# Patient Record
Sex: FEMALE | Race: WHITE | HISPANIC OR LATINO | URBAN - METROPOLITAN AREA
[De-identification: names, ages, dates, MRNs, and addresses within clinical notes are randomized per-mention and may not be internally consistent; named-entity substitution may affect disease eponyms.]

---

## 2023-09-09 ENCOUNTER — EMERGENCY (EMERGENCY)
Facility: HOSPITAL | Age: 23
LOS: 1 days | Discharge: HOME | End: 2023-09-09
Attending: EMERGENCY MEDICINE | Admitting: EMERGENCY MEDICINE
Payer: COMMERCIAL

## 2023-09-09 VITALS
OXYGEN SATURATION: 100 % | DIASTOLIC BLOOD PRESSURE: 69 MMHG | RESPIRATION RATE: 18 BRPM | HEIGHT: 62 IN | HEART RATE: 86 BPM | WEIGHT: 130.07 LBS | SYSTOLIC BLOOD PRESSURE: 123 MMHG | TEMPERATURE: 98 F

## 2023-09-09 VITALS
RESPIRATION RATE: 16 BRPM | OXYGEN SATURATION: 99 % | DIASTOLIC BLOOD PRESSURE: 76 MMHG | SYSTOLIC BLOOD PRESSURE: 110 MMHG | TEMPERATURE: 98 F | HEART RATE: 67 BPM

## 2023-09-09 PROCEDURE — 73070 X-RAY EXAM OF ELBOW: CPT

## 2023-09-09 PROCEDURE — 73070 X-RAY EXAM OF ELBOW: CPT | Mod: 26,LT

## 2023-09-09 PROCEDURE — 99283 EMERGENCY DEPT VISIT LOW MDM: CPT | Mod: 25

## 2023-09-09 PROCEDURE — 99284 EMERGENCY DEPT VISIT MOD MDM: CPT

## 2023-09-09 RX ORDER — LIDOCAINE 4 G/100G
1 CREAM TOPICAL ONCE
Refills: 0 | Status: COMPLETED | OUTPATIENT
Start: 2023-09-09 | End: 2023-09-09

## 2023-09-09 RX ORDER — IBUPROFEN 200 MG
600 TABLET ORAL ONCE
Refills: 0 | Status: COMPLETED | OUTPATIENT
Start: 2023-09-09 | End: 2023-09-09

## 2023-09-09 RX ORDER — IBUPROFEN 200 MG
1 TABLET ORAL
Qty: 21 | Refills: 0
Start: 2023-09-09 | End: 2023-09-15

## 2023-09-09 RX ORDER — CYCLOBENZAPRINE HYDROCHLORIDE 10 MG/1
1 TABLET, FILM COATED ORAL
Qty: 21 | Refills: 0
Start: 2023-09-09 | End: 2023-09-15

## 2023-09-09 RX ORDER — DIAZEPAM 5 MG
2 TABLET ORAL ONCE
Refills: 0 | Status: DISCONTINUED | OUTPATIENT
Start: 2023-09-09 | End: 2023-09-09

## 2023-09-09 RX ADMIN — LIDOCAINE 1 PATCH: 4 CREAM TOPICAL at 20:55

## 2023-09-09 RX ADMIN — Medication 2 MILLIGRAM(S): at 20:56

## 2023-09-09 RX ADMIN — Medication 600 MILLIGRAM(S): at 20:55

## 2023-09-09 RX ADMIN — Medication 600 MILLIGRAM(S): at 21:20
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
## 2023-09-09 NOTE — ED PROVIDER NOTE - CARE PROVIDER_API CALL
Yordy Dorantes  Orthopaedic Surgery  [address] [place] [address]  Phone: [phone]  Fax: [phone]  Follow Up Time:

## 2023-09-09 NOTE — ED ADULT NURSE NOTE - NSFALLUNIVINTERV_ED_ALL_ED
Bed/Stretcher in lowest position, wheels locked, appropriate side rails in place/Call bell, personal items and telephone in reach/Instruct patient to call for assistance before getting out of bed/chair/stretcher/Non-slip footwear applied when patient is off stretcher/[place] to call system/Physically safe environment - no spills, clutter or unnecessary equipment/Purposeful proactive rounding/Room/bathroom lighting operational, light cord in reach
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
## 2023-09-09 NOTE — ED PROVIDER NOTE - PATIENT PORTAL LINK FT
You can access the FollowMyHealth Patient Portal offered by [facility] by registering at the following website: [email or web address]. By joining [employer]’s FollowMyHealth portal, you will also be able to view your health information using other applications (apps) compatible with our system.
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
## 2023-09-09 NOTE — ED ADULT NURSE NOTE - CAS EDN DISCHARGE INTERVENTIONS
Occupational Therapy Discharge Summary      Visit Count: 3  30 VISITS PER CELESTE YR  Next Referring Provider Visit: 5/8/17    Referred by: Dr. Doron Steven DO  Medical Diagnosis (from order): History of arthroscopic procedure on shoulder [Z98.890]  - Primary    Insurance: 1. [employer]  2. N/A    Date of Surgery: 4/27/17; surgery performed: shoulder arthroscopy/debridement; rehabilitation guidelines: yes  Diagnosis Precautions: Range of Motion Restrictions: See protocol  Relevant co-morbidities and medications: None   Relevant Tests: Relevant diagnostic tests: plain film radiograph, MRI      SUBJECTIVE   Requests discharge this date  Current Pain: 0/10.    Functional Change: Able to complete all tasks. Activity restriction for work at this time.    OBJECTIVE     Flexion - 180  Abduction - 180  Internal Rotation - 90  External Rotation - 90  Patient denies pain    MMT   5/5 for shoulder shoulder testing    Denies pain with range of motion and strength testing    Outcome Measures: (Outcome Scoring)  Disabilities of the Arm, Shoulder and Hand (DASH): DASH Score Calculated: 0 (scored 0-100; a higher score indicates greater disability)     Treatment   Reassessment for discharge    Therapeutic Exercise:   UBE - 2 minutes forwards and 2 minutes backwards - bilateral - 4.0 resistance   Pink theraputty -  and pull  Standing shoulder flexion with weighted cane - 10 pounds X 20 repetitions  Medicine ball - 10 pounds - PNF  Blue theraband - 1 set X 20 repetitions   - Internal rotation   - External rotation   - Scapular row   - Horizontal abduction  Scaption - 3 pounds X 20 repetitions     Therapeutic Activity:  Review home program    Current Home Program (not performed this date except as noted above):   Range of motion, strengthening     ASSESSMENT   To date the patient has made gains in pain, range of motion, strength, wound/scar/edema management.      Pain after treatment: 0/10.    Compliant with therapy visits  and home exercise program: Yes  Progress toward discharge/long term goals: excellent progress    Discharge from skilled therapy with instructions/recommendations: discharge to home program   Result of above outlined education: Verbalizes understanding and Demonstrates understanding    Discharge Measures:   Total Number of Visits: 3  Surgery Date: 4/27/2017  Treatment Category: Shoulder Other, Surgical  Outcome Measure:  Disabilities of the Arm, Shoulder, and Hand   Initial Outcome Score:  16   Discharge Score Error: None  Discharge Outcome Score: 0  Primary Clinician: Flako Linder  Subjective Percent Improvement With Therapy: 100    Goals:       To be obtained by end of this plan of care:  1. Patient independent with modified and progressed home exercise program - MET  2. Patient will increase involved shoulder active range of motion to flexion 160° to aid in normalization of upper extremity movements to aid activities of independent daily living - MET  3. Patient will increase involved shoulder strength to 5/5 to aid in completion of household tasks for independent living, returning to work/school - MET  4. Patient will be able to reach behind back without  pain/difficulty to improve function in dressing - MET  5. Patient will be able to reach over head without  pain/difficulty to improve function in cooking, reaching into cupboard, grooming - MET  6. Patient will be able to sleep 6 hours without disruption from pain - MET  7. DASH: Patient will complete form to reflect an improved score from initial score of 16 to less than or equal to 10 (scored 0-100; a higher score indicates greater disability) to indicate pt reported improvement in function/disability/impairment (minimal detectable change: 15 points) - MET    PLAN   Discharge to home program. Patient provided with contact information with future questions/concerns.     THERAPY DAILY BILLING   Primary Insurance: [facility]  Secondary Insurance:  N/A    Evaluation Procedures:  No evaluation codes were used on this date of service    Timed Procedures:  Therapeutic Exercise, 45 minutes    Untimed Procedures:  No untimed codes were used on this date of service    Total Treatment Time: 45 minutes    The referring provider's electronic or written signature on the evaluation authorizes the therapy plan of care.     no iv cannula
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
## 2023-09-09 NOTE — ED PROVIDER NOTE - NS ED ATTENDING STATEMENT MOD
This was a shared visit with the BRIANNE. I reviewed and verified the documentation and independently performed the documented:
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
## 2023-09-17 ENCOUNTER — TRANSCRIPTION ENCOUNTER (OUTPATIENT)
Age: 23
End: 2023-09-17